# Patient Record
Sex: FEMALE | Race: WHITE | NOT HISPANIC OR LATINO | Employment: FULL TIME | URBAN - METROPOLITAN AREA
[De-identification: names, ages, dates, MRNs, and addresses within clinical notes are randomized per-mention and may not be internally consistent; named-entity substitution may affect disease eponyms.]

---

## 2019-08-25 ENCOUNTER — HOSPITAL ENCOUNTER (EMERGENCY)
Facility: HOSPITAL | Age: 44
Discharge: HOME | End: 2019-08-25
Attending: EMERGENCY MEDICINE

## 2019-08-25 VITALS
BODY MASS INDEX: 18.95 KG/M2 | HEIGHT: 64 IN | RESPIRATION RATE: 16 BRPM | WEIGHT: 111 LBS | HEART RATE: 87 BPM | OXYGEN SATURATION: 100 % | TEMPERATURE: 98 F | SYSTOLIC BLOOD PRESSURE: 115 MMHG | DIASTOLIC BLOOD PRESSURE: 67 MMHG

## 2019-08-25 DIAGNOSIS — R05.9 COUGH: ICD-10-CM

## 2019-08-25 DIAGNOSIS — J40 BRONCHITIS: Primary | ICD-10-CM

## 2019-08-25 DIAGNOSIS — R00.2 PALPITATIONS: ICD-10-CM

## 2019-08-25 PROCEDURE — 63600175 PHARM REV CODE 636 W HCPCS: Performed by: EMERGENCY MEDICINE

## 2019-08-25 PROCEDURE — 25000242 PHARM REV CODE 250 ALT 637 W/ HCPCS: Performed by: EMERGENCY MEDICINE

## 2019-08-25 PROCEDURE — 96372 THER/PROPH/DIAG INJ SC/IM: CPT | Mod: 59

## 2019-08-25 PROCEDURE — 99284 EMERGENCY DEPT VISIT MOD MDM: CPT | Mod: 25

## 2019-08-25 PROCEDURE — 93005 ELECTROCARDIOGRAM TRACING: CPT

## 2019-08-25 RX ORDER — DOXYCYCLINE 100 MG/1
100 CAPSULE ORAL 2 TIMES DAILY
Qty: 20 CAPSULE | Refills: 0 | Status: SHIPPED | OUTPATIENT
Start: 2019-08-25

## 2019-08-25 RX ORDER — ALBUTEROL SULFATE 90 UG/1
2 AEROSOL, METERED RESPIRATORY (INHALATION) EVERY 6 HOURS PRN
Status: DISCONTINUED | OUTPATIENT
Start: 2019-08-25 | End: 2019-08-25 | Stop reason: HOSPADM

## 2019-08-25 RX ORDER — DEXAMETHASONE SODIUM PHOSPHATE 4 MG/ML
8 INJECTION, SOLUTION INTRA-ARTICULAR; INTRALESIONAL; INTRAMUSCULAR; INTRAVENOUS; SOFT TISSUE
Status: COMPLETED | OUTPATIENT
Start: 2019-08-25 | End: 2019-08-25

## 2019-08-25 RX ORDER — DEXAMETHASONE 4 MG/1
4 TABLET ORAL EVERY 12 HOURS
Qty: 14 TABLET | Refills: 0 | Status: SHIPPED | OUTPATIENT
Start: 2019-08-25 | End: 2019-09-01

## 2019-08-25 RX ADMIN — ALBUTEROL SULFATE 2 PUFF: 90 AEROSOL, METERED RESPIRATORY (INHALATION) at 05:08

## 2019-08-25 RX ADMIN — DEXAMETHASONE SODIUM PHOSPHATE 8 MG: 4 INJECTION, SOLUTION INTRAMUSCULAR; INTRAVENOUS at 04:08

## 2019-08-25 NOTE — ED NOTES
Patient discharge has been delayed due to.waiting for medication from pharmacy and waiting shot time.

## 2019-08-25 NOTE — ED PROVIDER NOTES
Encounter Date: 2019       History     Chief Complaint   Patient presents with    Cough     X 1 MOS     Patient here reported cough for approximately 1 month cough is productive of yellow sputum no fever chills no chest pain she does report dyspnea while working associated with a cough with she smokes and has been attempting to quit smoking she denies any chest pain no lower extremity swelling patient reports a history of pneumonia in the past states she went to urgent care who placed her on steroids however the symptoms did not resolve    The history is provided by the patient.     Review of patient's allergies indicates:  No Known Allergies  Past Medical History:   Diagnosis Date    Bronchitis      Past Surgical History:   Procedure Laterality Date     SECTION      TUBAL LIGATION       History reviewed. No pertinent family history.  Social History     Tobacco Use    Smoking status: Current Every Day Smoker   Substance Use Topics    Alcohol use: Not Currently    Drug use: Not on file     Review of Systems   Constitutional: Positive for fatigue. Negative for chills and fever.   HENT: Negative for congestion, rhinorrhea, sinus pressure, sinus pain and sore throat.    Respiratory: Positive for cough and shortness of breath. Negative for apnea, choking, chest tightness and wheezing.    Cardiovascular: Negative for chest pain and leg swelling.   Gastrointestinal: Negative for nausea and vomiting.   Neurological: Negative for headaches.       Physical Exam     Initial Vitals [19 1531]   BP Pulse Resp Temp SpO2   116/74 100 18 98.2 °F (36.8 °C) 99 %      MAP       --         Physical Exam    Constitutional: She appears well-developed and well-nourished. She is not diaphoretic.   HENT:   Head: Normocephalic and atraumatic.   Right Ear: Tympanic membrane and ear canal normal.   Left Ear: Tympanic membrane and ear canal normal.   Mouth/Throat: Uvula is midline, oropharynx is clear and moist and  mucous membranes are normal.   Eyes: Lids are normal.   Cardiovascular: Normal rate, regular rhythm, normal heart sounds and intact distal pulses.   Pulmonary/Chest: No respiratory distress. She has no wheezes. She has rhonchi. She has no rales.   Abdominal: Soft. Bowel sounds are normal.   Musculoskeletal: Normal range of motion.   Neurological: She is alert and oriented to person, place, and time.   Skin: Skin is warm and dry. Capillary refill takes less than 2 seconds.   Psychiatric: She has a normal mood and affect. Thought content normal.         ED Course   Procedures  Labs Reviewed - No data to display     ECG Results          EKG 12-lead (In process)  Result time 08/25/19 15:40:55    In process by Interface, Lab In [facility] (08/25/19 15:40:55)                 Narrative:    Test Reason : R00.2,    Vent. Rate : 088 BPM     Atrial Rate : 088 BPM     P-R Int : 108 ms          QRS Dur : 084 ms      QT Int : 354 ms       P-R-T Axes : 034 063 047 degrees     QTc Int : 428 ms    Sinus rhythm with short RI  Otherwise normal ECG  No previous ECGs available    Referred By: [email or web address]   SELF           Confirmed By:                             Imaging Results          X-Ray Chest PA And Lateral (Final result)  Result time 08/25/19 16:25:08    Final result by Brendan Gutierrez MD (08/25/19 16:25:08)                 Impression:      1. Mild diffuse abnormal interstitial prominence which could be on the basis of mild interstitial fibrotic change or atypical infection.  Interstitial edema is felt less likely.  2. Aortic atherosclerosis.      Electronically signed by: Brendan Gutierrez MD  Date:    08/25/2019  Time:    16:25             Narrative:    EXAMINATION:  XR CHEST PA AND LATERAL    CLINICAL HISTORY:  Two-view chest    Clinical data: Cough, palpitations    COMPARISON:  None.    FINDINGS:  PA and lateral views demonstrate no cardiac, pulmonary, or osseous abnormalities.  PA and lateral view show the heart to be within  normal size limits.  The mediastinum is unremarkable.  Mild bilateral diffuse interstitial prominence is likely chronic.  Pulmonary interstitial markings are mildly prominent, with no acute infiltrates or effusions identified.  Osseous structures are intact.                                                      Clinical Impression:       ICD-10-CM ICD-9-CM   1. Bronchitis J40 490   2. Palpitations R00.2 785.1   3. Cough R05 786.2                                Rocky Munoz MD  08/25/19 1701